# Patient Record
(demographics unavailable — no encounter records)

---

## 2024-10-17 NOTE — PHYSICAL EXAM
[de-identified] : General: Awake, alert, no acute distress, Patient was cooperative and appropriate during the examination.  The patient is of normal weight for height and age.  Walks without an antalgic gait.   Right shoulder Exam: Physical exam of the shoulder demonstrates normal skin without signs of skin changes or abnormalities. No erythema, warmth, or joint effusion appreciated.  Sensation intact light touch C5-T1 Palpable radial pulse Radial/ulnar/median/axillary/musculocutaneous/AIN/PIN nerves grossly intact  Range of motion: Forward Flexion: 175 Abduction: 140 External Rotation: 45 Internal Rotation: L3  Palpation: Not tender to palpation over the glenohumeral joint Moderately tender palpation over the rotator cuff insertion on the greater tuberosity Not tender to palpation over the AC joint Mildly tender to palpation of the biceps tendon/bicipital groove  Strength testing: Supraspinatus: 5/5 Infraspinatus: 5/5 Subscapularis: 5/5  Special test: Empty can test positive Bryant impingement test positive Speeds test negative Stanislaus's test negative Lift-off test negative Bell-press test negative Cross-arm adduction test negative   [de-identified] : X-rays including 4 views of the right shoulder were obtained in the office on 10/17/2024 and reviewed with the patient.  There is no acute fracture or dislocation.  Mild degenerative changes are noted.

## 2024-10-17 NOTE — DISCUSSION/SUMMARY
[de-identified] : Assessment: 62-year-old male with right shoulder pain secondary to rotator cuff tendinitis  Plan: I had a long discussion with the patient today regarding the nature of their diagnosis and treatment plan. We discussed the risks and benefits of no treatment as well as nonoperative and operative treatments.  I reviewed the patient's x-rays today with him in the office which are negative for any acute pathology.  On examination he has good range of motion and strength with positive impingement signs.  At this time I recommend conservative treatment of the patient's condition with modalities including rest, ice, heat, anti-inflammatory medications, activity modifications, and home stretching and strengthening exercises.  A prescription for naproxen was sent to the patient's pharmacy today.  I discussed with the patient the risks and benefits associated with NSAID use. GI precautions were discussed.  A referral for physical therapy was provided to begin working on exercises to help improve their strength and function.  Recommend follow-up in 8 weeks for repeat clinical assessment as needed.  If symptoms persist we may consider an injection versus an MRI.  If the patient does have significant pain that persists over the next couple weeks he may call my office for a sooner appointment to return for an injection.  The patient verbalizes their understanding and agrees with the plan.  All questions were answered to their satisfaction.  I, Dr. Cornejo, personally performed the evaluation and management (E/M) services for this new patient.  That E/M includes conducting the clinically appropriate initial history &/or exam, assessing all conditions, and establishing the plan of care.  Today, my MEETA, was here to observe my evaluation and management service for this patient & follow plan of care established by me going forward.

## 2024-10-17 NOTE — HISTORY OF PRESENT ILLNESS
[de-identified] : 10/17/2024: Kwame is a pleasant 62-year-old right-hand-dominant male who presents to the office today for evaluation of right shoulder pain.  The patient states that his pain began a year or 2 ago but he denies any history of trauma.  The pain is activity related and alleviated by rest.  Hurts to reach overhead or behind him.  He has previously gone for physical therapy for the shoulder last year which was not very helpful.  He has had no other recent treatment.  The pain has gotten worse over the past 6 months.  He is here today for specialist opinion.  He denies any fevers, chills, sweats, or pain elsewhere.

## 2024-11-22 NOTE — ADDENDUM
[FreeTextEntry1] : I, Susana Cee assisted in documentation on 11/18/2024 at acting as a scribe for and in the presence of Dr. Storm Ruiz.

## 2024-11-22 NOTE — HISTORY OF PRESENT ILLNESS
[FreeTextEntry1] : 63 yo male presents today as a new patient referred by [facility] for elevated PSA and abnormal prostate MRI.  He had a cystoscopy with Dr. Ruth on 11/6/24 for gross hematuria that was negative.  Patient states he had a prior negative biopsy at [place]  Recent PSA: 6.44 (11.13.24) Free PSA: 11% (11.13.24) 10.30 on (9.24.24) 9.88 on (12.18.23) 7.31 on (6.29.23) 9.19 on (2.13.23)  MRI prostate on (5.28.24) showed  Leson 1: PIRADS 3 Lesions 2: PIRADS 3 PV: 93cc

## 2024-11-22 NOTE — HISTORY OF PRESENT ILLNESS
[FreeTextEntry1] : 63 yo male presents today as a new patient referred by [facility] for elevated PSA and abnormal prostate MRI.  He had a cystoscopy with Dr. Ruth on 11/6/24 for gross hematuria that was negative.  Patient states he had a prior negative biopsy at [place]  Recent PSA: 6.44 (11.13.24) Free PSA: 11% (11.13.24) 10.30 on (9.24.24) 9.88 on (12.18.23) 7.31 on (6.29.23) 9.19 on (2.13.23)  MRI prostate on (5.28.24) showed  Leson 1: PIRADS 3 Lesions 2: PIRADS 3 PV: 93cc

## 2024-11-22 NOTE — ASSESSMENT
[FreeTextEntry1] : Reviewed all diagnostic testing with patient. Personally reviewed the images. Do not recommend a prostate biopsy at this time.  Plan is to repeat PSA in 6 months. He will follow-up if PSA continues to rise.  Next follow-up with Dr. Ruth in 6 months. 30 min discussion on elevated PSA, MRI review.

## 2024-12-17 NOTE — ASSESSMENT
[FreeTextEntry1] : ASSESSMENT: The patient comes in today with chronic worsening symptoms of right shoulder and right elbow discomfort we have discussed shoulder bursitis/tendinopathy/impingement and mild arthropathy and joint irritation as well as tendinitis laterally.  I E tennis elbow at this point in time we have discussed treatment options.  The patient does elect for injections for all the above.  We have discussed risks and benefits and glucose elevation.  He elects to proceed   The patient was adequately and thoroughly informed of my assessment of their current condition(s).  - This may diminish bodily function for the extremity. We discussed prognosis, tx modalities including operative and nonoperative options for the above diagnostic assessment. As always, 2nd opinion is always provided as an option.  When accessible, I was able to review other physicians note(s) including reviewing other tests, imaging results as well as personally view these results for my own interpretation.  [Right] SUBACROMIAL SHOULDER INJECTION   Indication:  subacromial bursitis/impingement, pain   Risk, benefits and alternatives were discussed with the patient. Potential complications include bleeding and infection. Alcohol was used to prep the area.  Ethyl chloride spray was used as a topical anesthetic.  Using sterile technique, the injection needle was then directed from a standard posterior approach parallel to and inferior to the acromion. A 21g needle was used to inject 5 mL of 1% Lidocaine and 1 unit 10mg Kenalog.  No significant resistance was encountered.  A bandage was applied.  The patient tolerated the procedure well.    Patient instructed to avoid strenuous activity for 2 day(s). Specifically counseled regarding the signs and symptoms of potential infection and instructed to present promptly to clinic or hospital if such signs and symptoms arise.   Injection:   The risks and benefits of a steroid injection were discussed in detail. The risks include but are not limited to: pain, infection, swelling, flare response, bleeding, subcutaneous fat atrophy, skin depigmentation and/or elevation of blood sugar. The risk of incomplete resolution of symptoms, recurrence and additional intervention was reviewed and considered by the patient. The patient agreed to proceed and under a sterile prep, I injected 1 unit 6mg into 1 cc of a combination of Celestone and Lidocaine into the right elbow lateral tendon subs sheath, right elbow joint. The patient tolerated the injection well.  The patient was adequately and thoroughly informed of my assessment of their current condition(s).  DISCUSSION: 1.  Right shoulder and right elbow injections as above.  Activity modification.  HEP 2.  Follow-up 3 months as req 3. [x]

## 2024-12-17 NOTE — PHYSICAL EXAM
[de-identified] : Examination of the [right] shoulder reveals equal active and passive motion as compared to the contralateral side There is a positive Speed, positive Liliana, positive Bryant, tenderness with anterior shoulder compression. 3+/5 strength  Examination of the [right] elbow reveals tenderness at the level of the lateral epicondyle. There is pain with resisted wrist and finger extension at the elbow. Examination of the right elbow reveals tenderness at the level of the radiocapitellar juncture.  There is slight bogginess.  No infection [de-identified] : 3 views of [right] elbow were reviewed today in my office and were seen by me and discussed with the patient.  These are demonstrating mild arthropathy of the elbow

## 2024-12-17 NOTE — HISTORY OF PRESENT ILLNESS
[FreeTextEntry1] : Kwame is a pleasant 62-year-old male presenting today with chronic worsening history of right shoulder and elbow discomfort.  Describes difficulty with lifting.  Difficulty with ADLs.  Patient states that prior injections were very helpful.  The patient requests repeat injections today including right shoulder

## 2024-12-19 NOTE — PHYSICAL EXAM
[de-identified] : CONSTITUTIONAL: Patient is a very pleasant individual who is well-nourished and appears stated age. PSYCHIATRIC: Alert and oriented times three and in no apparent distress, and participates with orthopedic evaluation well. HEAD: Atraumatic and nonsyndromic in appearance. EENT: No thyromegaly, EOMI. RESPIRATORY: Respiratory rate is regular, not dyspneic on examination. LYMPHATICS: There is no cervical or axillary lymphadenopathy. INTEGUMENTARY: Skin is clean, dry, and intact about the bilateral upper extremities and cervical spine. VASCULAR: There is brisk capillary refill about the bilateral upper extremities and radial pulses are 2/4.  Cervical Spine Exam: Right sided paraspinal muscle hypertonicity Difficulty with rotation of his head to the left until discomfort is felt only 50 degrees Shoulder Abduction (C5): 5/5 B/L Biceps (C6): 5/5 B/L Wrist Extension (C6): 5/5 B/L Triceps (C7): 5/5 B/L Wrist Flexion (C7): 5/5 B/L Finger Adduction/Abduction (C8/T1): 5/5 B/L Sensation:  SILT C5-T1 bilateral  Reflexes: 2+ reflexes Bicep/Tricep/Quadriceps/Achilles  Gait: Normal gait w/o assistance Able to perform tandem gait Able to Heel Walk Able to Toe Walk  Special Testing:  Positive Spurlings recreating neck pain  negative clonus BL LE negative Hoffmans B/L UE   [de-identified] : Upright AP, lateral, and flexion/extension radiographs of the cervical spine performed on 12/19/2024 in the Radiology Department at [facility] for the indication of neck are reviewed.  These studies demonstrate maintenance of cervical lordosis there is mild disc at loss and spondylosis at C4-5 and C5-C6.

## 2024-12-19 NOTE — ASSESSMENT
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

## 2025-03-04 NOTE — HISTORY OF PRESENT ILLNESS
[de-identified] : CC: Neck and bilateral shoulder pain  HPI; 63-year-old male presenting today for routine follow-up.  About 3 months ago there was complaining of neck and shoulder pain mainly in the neck is waking up at nighttime he had difficulty sleeping tried a Medrol Dosepak he states that did not help his symptoms mainly complains of bilateral shoulder pain worse at nighttime he has difficulty sleeping or laying on his left side left greater than right mainly being in the left lateral deltoid left shoulder some radiation to the neck most bothersome at nighttime and will aggravate him during the day with overhead activities denies any radiation below the elbow.

## 2025-03-04 NOTE — PROCEDURE
[FreeTextEntry1] : Procedure: Injection of the left subacromial bursa. Indication:  inflammation. Potential complications include bleeding, infection and allergic reaction. Risk, benefits and alternatives were discussed with the patient. Verbal consent was obtained prior to the procedure. Alcohol was used to prep the area. no local anesthesia was used. Using sterile technique, the aspiration/injection needle was then directed from a lateral and posterior aspect. A 22-gauge was used to inject 2 mL of 1% Lidocaine and 1 mL of 3mg/mL betamethasone. A bandage was applied. The patient tolerated the procedure well. Complications: none.

## 2025-03-04 NOTE — DISCUSSION/SUMMARY
[de-identified] : 63-year-old male with left shoulder subacromial bursitis versus bilateral cervical radiculopathy  I discussed with Kwame that given his positive impingement signs this could be related to the subacromial bursitis versus rotator cuff therefore we elected to try a subacromial injection today if fails to respond to this I will see him back in 4 weeks and we will move forward with more advanced imaging of the cervical spine to rule out possible C5/C6 radiculopathy

## 2025-03-04 NOTE — PHYSICAL EXAM
[de-identified] : There is positive impingement sign's left shoulder with abduction external rotation positive Bryant positive Neer sign recreating symptoms

## 2025-03-25 NOTE — DISCUSSION/SUMMARY
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

## 2025-03-25 NOTE — HISTORY OF PRESENT ILLNESS
[de-identified] : CC: Neck pain  hpi: 63-year-old male coming in today routine follow-up visit.  I was a has been doing with neck shoulder pain for several months now.  He had a recent left shoulder subacromial injection during his last visit about 3 weeks ago he states that his shoulder symptoms have completely resolved he is able to sleep on his side do overhead activities happy with that he still complaining of neck pain mainly paraspinal area worse with range of motion worse when he gets up in the morning some radiation and radiation to bilateral scapula has not improved with physical therapy time medications injections.  He would like to consider other options for better pain management

## 2025-05-10 NOTE — ADDENDUM
[FreeTextEntry1] : IGermania NP, assisted with documentation on 05/07/2025 in the presence and under the direction of Dr. Storm Ruiz.

## 2025-05-10 NOTE — ASSESSMENT
[FreeTextEntry1] : Reviewed all diagnostic testing with patient. Personally reviewed the images. MRI negative. Pt with enlarged prostate - would recommend cystoscopy to evaluate for potential treatment options. PSA slightly higher than last but remains stable. Repeat PSA in 6 months. Next follow-up appointment for cystoscopy. All questions answered patient agreeable with plan. 20 min discussion for MRI review, PSA evaluation and follow-up. Time spent is for reviewing chart, labs and images (if available), counseling and care coordination.

## 2025-05-10 NOTE — HISTORY OF PRESENT ILLNESS
[FreeTextEntry1] : 64 y/o male presents today for follow-up. Referred by Dr. Ruth for elevated PSA and prostate MRI review.   He is here today to determine if he needs a prostate biopsy.   Prostate MRI on (4.29.25) showed No MRI targetable lesions. PIRADS 2. PV: 79.2 mL   PSAD: 0.10   Previous MRI showed two PIRADS 3 lesions (5.28.24) ([facility])  Had negative prostate biopsy in past at [facility] approx. 5 years ago.   Most recent PSA: 8.19 ng/mL with a free PSA of 14% on (4.23.25)  PSA Trend:  6.44 (11.13.24)  10.30 (9.24.24)  9.88 (12.18.23)  7.31 (6.29.23)  9.19 (2.13.23)    He had a cystoscopy with Dr. Ruth on 11/6/24 for gross hematuria that was negative. Urine cytology (1.16.25) - negative KUB US (1.28.25): No hydronephrosis or nephrolithiasis. Prostatomegaly.  Reports some urinary frequency.  Positive family history of prostate cancer - father